# Patient Record
Sex: FEMALE | Race: WHITE | NOT HISPANIC OR LATINO | Employment: OTHER | URBAN - METROPOLITAN AREA
[De-identification: names, ages, dates, MRNs, and addresses within clinical notes are randomized per-mention and may not be internally consistent; named-entity substitution may affect disease eponyms.]

---

## 2024-03-27 ENCOUNTER — MEDICAL CORRESPONDENCE (OUTPATIENT)
Dept: HEALTH INFORMATION MANAGEMENT | Facility: CLINIC | Age: 74
End: 2024-03-27

## 2024-03-28 ENCOUNTER — TRANSCRIBE ORDERS (OUTPATIENT)
Dept: OTHER | Age: 74
End: 2024-03-28

## 2024-03-28 DIAGNOSIS — D03.9 MELANOMA IN SITU, UNSPECIFIED SITE (H): Primary | ICD-10-CM

## 2024-03-29 ENCOUNTER — TELEPHONE (OUTPATIENT)
Dept: DERMATOLOGY | Facility: CLINIC | Age: 74
End: 2024-03-29
Payer: COMMERCIAL

## 2024-03-29 NOTE — TELEPHONE ENCOUNTER
Left patient a voicemail to schedule a consult & mohs for   [facility] Mohs.  MIS, Left ankle-anterior, lentigo maligna pattern, narrowly free of sampled biopsy margins.  [id number] shave biopsy        with Dr. Caldwell. Provided my direct phone number.    Bisi Frank on 3/29/2024 at 9:46 AM

## 2024-04-04 NOTE — TELEPHONE ENCOUNTER
Left patient a voicemail to schedule a consult & mohs for   [facility] Mohs.  MIS, Left ankle-anterior, lentigo maligna pattern, narrowly free of sampled biopsy margins.  [id number] shave biopsy         with Dr. Caldwell. Provided my direct phone number.     Bisi Frank, Procedure  4/4/2024 1:42 PM

## 2024-04-04 NOTE — TELEPHONE ENCOUNTER
Called patient to schedule surgery with Dr. Caldwell    Date of Surgery: 05/08     Surgery type: Mohs    Consult scheduled: Yes    Has patient had mohs with us before? No    Additional comments: lissa Frank on 4/4/2024 at 4:04 PM

## 2024-04-12 NOTE — TELEPHONE ENCOUNTER
FUTURE VISIT INFORMATION      FUTURE VISIT INFORMATION:  Date: 4.16.24  Time: 3:00  Location: [facility]  REFERRAL INFORMATION:  Referring provider:  Edi  Referring providers clinic:  [facility]  Reason for visit/diagnosis  UMN Mohs. MIS, Left ankle-anterior, lentigo maligna pattern, narrowly free of sampled biopsy margins. [id number] shave biopsy      RECORDS REQUESTED FROM:       Clinic name Comments Records Status Imaging Status   [facility] 3.21.24  Path # [id number] CE Received

## 2024-04-16 ENCOUNTER — PRE VISIT (OUTPATIENT)
Dept: DERMATOLOGY | Facility: CLINIC | Age: 74
End: 2024-04-16

## 2024-04-16 ENCOUNTER — OFFICE VISIT (OUTPATIENT)
Dept: DERMATOLOGY | Facility: CLINIC | Age: 74
End: 2024-04-16
Payer: COMMERCIAL

## 2024-04-16 DIAGNOSIS — D03.72 MELANOMA IN SITU OF LEFT LOWER EXTREMITY (H): Primary | ICD-10-CM

## 2024-04-16 PROCEDURE — 99203 OFFICE O/P NEW LOW 30 MIN: CPT | Performed by: DERMATOLOGY

## 2024-04-16 ASSESSMENT — PAIN SCALES - GENERAL: PAINLEVEL: NO PAIN (0)

## 2024-04-16 NOTE — PROGRESS NOTES
Dermatologic Surgery Consultation Note    Dermatology Problem List:  Melanoma  - MIS, LM type, left ankle s/p shave 3/21/24, MMS scheduled 5/8 @ 8a [facility]  - MIS, LM type, left upper anterior arm, s/p shave 3/21/24  - MM, left upper back, BD: 0.4 mm, Navin's: IV, pT1a, s/p shave 3/21/24, s/p exc 4/5/24  Sebaceous Hyperplasia, head / anterior face  AK's s/p cryo    CC: mohs consult (Left ankle anterior)      Subjective: Trinity Theodore is a 73 year old female who presents today for Mohs micrographic surgery consultation for a recent diagnosis of skin cancer.  - Skin cancer(s): Melanoma in situ  - Location(s): L Ankle   - Referred by Dr. Prince Daley affiliated with [facility].   - no other concerns today    Objective: An exam of the left ankle was performed today   - 1 cm biopsy ulcer    Path report:   Case: [id number]     Collected:   03/21/2024     FINAL DIAGNOSIS    A.  Skin, Left Ankle - Anterior, shave:  - Melanoma in situ, lentigo maligna pattern, narrowly free of sampled biopsy margins.       B.  Skin, Left Upper Arm - Anterior, shave:  - Melanoma in situ, lentigo maligna pattern, narrowly free of sampled biopsy margins.       COMMENT (A,B):  These two junctional melanocyte proliferations are similar, each composed of crowded individual and nested atypical cells along and above the dermoepidermal junction, in significantly sun-damaged skin, without dermal invasion. Each site should be excised to ensure complete removal with a margin of uninvolved skin. The AJCC stage for each is pTis.     C.  Skin, Left Upper Back, shave:  - Malignant melanoma with the following features:  Breslow depth: 0.4 mm  Navin level: IV  Ulceration: Not identified   Angiolymphatic/perineural invasion: Not identified   Mitotic rate: 3 per square mm  Features of regression: Not identified   Associated nevus: Equivocal  Margins: Narrowly free of melanoma  AJCC stage: pT1a (8th ed AJCC Guidelines).      COMMENT (C): The  histologic  type  of this lesion is superficial spreading for tumor registry purposes. No macroscopic satellite nodule or microscopic satellitosis is evident in this specimen.      Assessment and Plan:     1. Plan for Mohs micrographic surgery for skin cancer(s) above:  *Review lab result(s): Dermpath report   - We discussed the nature of the diagnosis/condition above. We discussed the treatment options, including the risks benefits and expectations of these options. We recommend micrographic surgery as the most effective and most tissue sparing option for treatment, and the patient agrees to proceed with this.  The patient is aware of the risks, benefits and expectations of this procedure. The patient will be scheduled for this procedure, if not already done so.  - We anticipate the following closure type: Linear closure, such as complex linear closure (CLC)    Patient was discussed with and evaluated by attending physician Dr. Caldwell.    Attending Attestation  I attest that the Scribe recorded the interview and exam that I personally performed.  I have reviewed the note and edited it as necessary.    Eloy Caldwell M.D.  Professor  Director of Dermatologic Surgery  Department of Dermatology  [facility]

## 2024-04-16 NOTE — NURSING NOTE
Chief Complaint   Patient presents with    mohs consult     Left ankle anterior     Noelle CANTU CMA

## 2024-04-16 NOTE — LETTER
4/16/2024       RE: Trinity Theodore  [address]  [facility] [facility] [address]     Dear Colleague,    Thank you for referring your patient, Trinity Theodore, to the [facility] DERMATOLOGIC SURGERY CLINIC [place] at [facility]. Please see a copy of my visit note below.    Dermatologic Surgery Consultation Note    Dermatology Problem List:  Melanoma  - MIS, LM type, left ankle s/p shave 3/21/24, MMS scheduled 5/8 @ 8a [facility]  - MIS, LM type, left upper anterior arm, s/p shave 3/21/24  - MM, left upper back, BD: 0.4 mm, Navin's: IV, pT1a, s/p shave 3/21/24, s/p exc 4/5/24  Sebaceous Hyperplasia, head / anterior face  AK's s/p cryo    CC: mohs consult (Left ankle anterior)      Subjective: Trinity Theodore is a 73 year old female who presents today for Mohs micrographic surgery consultation for a recent diagnosis of skin cancer.  - Skin cancer(s): Melanoma in situ  - Location(s): L Ankle   - Referred by Dr. Prince Daley affiliated with [facility].   - no other concerns today    Objective: An exam of the left ankle was performed today   - 1 cm biopsy ulcer    Path report:   Case: [id number]     Collected:   03/21/2024     FINAL DIAGNOSIS    A.  Skin, Left Ankle - Anterior, shave:  - Melanoma in situ, lentigo maligna pattern, narrowly free of sampled biopsy margins.       B.  Skin, Left Upper Arm - Anterior, shave:  - Melanoma in situ, lentigo maligna pattern, narrowly free of sampled biopsy margins.       COMMENT (A,B):  These two junctional melanocyte proliferations are similar, each composed of crowded individual and nested atypical cells along and above the dermoepidermal junction, in significantly sun-damaged skin, without dermal invasion. Each site should be excised to ensure complete removal with a margin of uninvolved skin. The AJCC stage for each is pTis.     C.  Skin, Left Upper Back, shave:  - Malignant melanoma with the following  features:  Breslow depth: 0.4 mm  Navin level: IV  Ulceration: Not identified   Angiolymphatic/perineural invasion: Not identified   Mitotic rate: 3 per square mm  Features of regression: Not identified   Associated nevus: Equivocal  Margins: Narrowly free of melanoma  AJCC stage: pT1a (8th ed AJCC Guidelines).      COMMENT (C): The histologic  type  of this lesion is superficial spreading for tumor registry purposes. No macroscopic satellite nodule or microscopic satellitosis is evident in this specimen.      Assessment and Plan:     1. Plan for Mohs micrographic surgery for skin cancer(s) above:  *Review lab result(s): Dermpath report   - We discussed the nature of the diagnosis/condition above. We discussed the treatment options, including the risks benefits and expectations of these options. We recommend micrographic surgery as the most effective and most tissue sparing option for treatment, and the patient agrees to proceed with this.  The patient is aware of the risks, benefits and expectations of this procedure. The patient will be scheduled for this procedure, if not already done so.  - We anticipate the following closure type: Linear closure, such as complex linear closure (CLC)    Patient was discussed with and evaluated by attending physician Dr. Caldwell.    Attending Attestation  I attest that the Scribe recorded the interview and exam that I personally performed.  I have reviewed the note and edited it as necessary.    Eloy Caldwell M.D.  Professor  Director of Dermatologic Surgery  Department of Dermatology  [facility]

## 2024-05-02 ENCOUNTER — MYC MEDICAL ADVICE (OUTPATIENT)
Dept: DERMATOLOGY | Facility: CLINIC | Age: 74
End: 2024-05-02
Payer: COMMERCIAL

## 2024-05-08 ENCOUNTER — OFFICE VISIT (OUTPATIENT)
Dept: DERMATOLOGY | Facility: CLINIC | Age: 74
End: 2024-05-08
Payer: COMMERCIAL

## 2024-05-08 VITALS — DIASTOLIC BLOOD PRESSURE: 89 MMHG | SYSTOLIC BLOOD PRESSURE: 172 MMHG | HEART RATE: 78 BPM

## 2024-05-08 DIAGNOSIS — D03.72 MELANOMA IN SITU OF LEFT LOWER EXTREMITY INCLUDING HIP (H): ICD-10-CM

## 2024-05-08 DIAGNOSIS — D03.9 MELANOMA IN SITU, UNSPECIFIED SITE (H): ICD-10-CM

## 2024-05-08 PROCEDURE — 88314 HISTOCHEMICAL STAINS ADD-ON: CPT | Mod: 59 | Performed by: DERMATOLOGY

## 2024-05-08 PROCEDURE — 88342 IMHCHEM/IMCYTCHM 1ST ANTB: CPT | Mod: TC | Performed by: DERMATOLOGY

## 2024-05-08 PROCEDURE — 88305 TISSUE EXAM BY PATHOLOGIST: CPT | Mod: 26 | Performed by: PATHOLOGY

## 2024-05-08 PROCEDURE — 15275 SKIN SUB GRAFT FACE/NK/HF/G: CPT | Mod: GC | Performed by: DERMATOLOGY

## 2024-05-08 PROCEDURE — 88342 IMHCHEM/IMCYTCHM 1ST ANTB: CPT | Mod: 26 | Performed by: PATHOLOGY

## 2024-05-08 PROCEDURE — 17313 MOHS 1 STAGE T/A/L: CPT | Mod: GC | Performed by: DERMATOLOGY

## 2024-05-08 NOTE — PATIENT INSTRUCTIONS
Post-Operative Care for Granulating Site  After your surgery, a pressure bandage will be placed over the area. This will help prevent bleeding. Please follow these instructions as they will help you to prevent complications as your wound heals.  For the First 48 hours After Surgery:  Leave the pressure bandage on and keep it dry. If it should come loose, you may retape it, but do not take it off.  Relax and take it easy. Do not do any vigorous exercise, heavy lifting, or bending forward. This could cause the wound to bleed.  Post-operative pain is usually mild. You may alternate between 1000 mg of Tylenol (acetaminophen) and 400 mg of Ibuprofen every 4 hours.  Do not take more than 4,000mg of acetaminophen in a 24 hour period or 3200 mg of Ibuprofen in a 24 hr period.  Avoid alcohol and vitamin E as these may increase your tendency to bleed.  You may put an ice pack around the bandaged area for 20 minutes every 2-3 hours. This may help reduce swelling, bruising, and pain. Make sure the ice pack is waterproof so that the pressure bandage does not get wet.   You may see a small amount of drainage or blood on your pressure bandage. This is normal. However, if drainage or bleeding continues or saturates the bandage, you will need to apply firm pressure over the bandage with a washcloth for 15 minutes. If bleeding continues after applying pressure for 15 minutes, apply an ice pack to the bandaged area for 15 minutes. If bleeding still continues, go to the nearest emergency room.  48 Hours After Surgery:  Carefully remove the bandage and start daily wound care and dressing changes. You may also now shower and get the wound wet. Use caution when washing the wound, be gentle.  Do not use a wash cloth on the wound.  Daily Wound Care:  Wash with mild soap and water every day until wound appears well-healed.  Rinse well and pat dry.  Apply Vaseline over site with a Q-tip and re-apply a dressing until wound appears well healed.  " A well healed wound is \"pinked over\" with a shiny surface.  When area is \"pinked over\" it is no longer necessary to apply Vaseline.  Call Us If:  You have pain that is not controlled with Tylenol.  You have signs or symptoms of an infection, such as: fever over 100 degrees F, redness, warmth, or foul-smelling or yellow/creamy drainage from the wound.  Who should I call with questions?  [facility]: [phone]  [facility]: [phone]  For urgent needs outside of business hours call the [facility] at [phone] and ask for the dermatology resident on call   "

## 2024-05-08 NOTE — LETTER
5/8/2024       RE: Trinity Theodore  [address] [place] [address]     Dear Colleague,    Thank you for referring your patient, Trinity Theodore, to the [facility] DERMATOLOGIC SURGERY CLINIC [place] at [facility]. Please see a copy of my visit note below.    [facility] Dermatologic Surgery Clinic [place] Procedure Note    Dermatology Problem List:     Superficial SSI, current tx: Keflex  Melanoma  - MIS/LM, left anterior ankle, s/p shave 3/21/24, s/p MMS 5/8/24  - MIS/LM, left upper anterior arm, s/p shave 3/21/24, s/p outside excision 4/19/24  - MM, BD 0.4 mm, L upper back, s/p outside excision 4/5/24    Date of Service:  May 8, 2024  Surgery: Mohs micrographic surgery    Case 1  Repair Type: Secondary Intent with Puracol Collagen Graft  Repair Size: N/A  Suture Material: None  Tumor Type: Melanoma in situ  Location: L Ankle  Derm-Path Accession #: [id number]   PreOp Size: 1.5 x 1.5 cm  PostOp Size: 2.1 x 2.0 cm  Mohs Accession #:   Level of Defect: Fat    Procedure:    Stage I  We discussed the principles of treatment and most likely complications including scarring, bleeding, infection, swelling, pain, crusting, nerve damage, large wound,  incomplete excision, wound dehiscence,  nerve damage, recurrence, and a second procedure may be recommended to obtain the best cosmetic or functional result.    Informed consent was obtained and the patient underwent the procedure as follows:  The patient was placed supine on the operating table.  The cancer was identified, outlined with a marker, and verified by the patient.  The entire surgical field was prepped with chlorhexidine.  The surgical site was anesthetized using lidocaine with epinephrine.    The area of clinically apparent tumor was excised and sent for permanent sections to rule out invasive melanoma. The peripheral rim of tissue was then surgically excised using a #15  blade and was then transferred onto a specimen sheet maintaining the orientation of the specimen. Hemostasis was obtained using bipolar electrocoagulation. The wound site was then covered with a dressing while the tissue samples were processed for examination.    The excised tissue was transported to the Mohs histology laboratory maintaining the tissue orientation.  The tissue specimen was relaxed so that the entire surgical margin was in a a single horizontal plane for sectioning and inked for precise mapping.  A precise reference map was drawn to reflect the sectioning of the specimen, colored inking of the margins, and orientation on the patient. The tissue was processed using horizontal sectioning of the base and continuous peripheral margins. Vertical, bread loafed sections were obtained from the central portion of the lesion, prior to being sent for permament sections. A control biopsy at the left shin was taken to compare the density and periodicity of melanocytes along the dermal-epidermal junction.     The tissue sections were stained with Hematoxylin and Eosin (H&E), as well as Melanoma antigen recognized by T cells or Melan-A (MART-1) stain. The histopathologic sections were reviewed in conjunction with the reference map.     Total blocks: 2   Total slides:  8      Was the skin lesion clear at this stage?: Yes, the skin lesion was determined clear at periphery at this stage: There was a relatively normal periodicity and density of melanocytes along the dermal-epidermal junction noted at the peripheral margins, therefore Mohs surgery was complete.    REPAIR: Secondary intent with Puracol collagen graft    The patient is status post Mohs micrographic surgery.  The surgical site was examined with attention to normal anatomic and functional relationships.  After consideration and discussion of multiple options with the patient, it was determined that healing by second intention would offer the best chance for  preservation/restoration of all normal anatomic and functional relationships.     The open wound was cleaned with chlorhexidine and rinsed with sterile saline, a Puracol collagen graft was utilized to promote healing and protect the wound bed. The graft was secured to the wound base using skin glue. A pressure dressing consisting of non-adherent gauze and tape was applied.   Wound care was discussed with patient both orally and in writing.     Puracol Lot Number: [id number]   Expiration: 8/31/26    Dr. Yashira Kim (Mohs micrographic surgery fellow) performed the Mohs micrographic surgery under the direct supervision of Eloy Caldwell MD, who was present for the entire micrographic surgery, and always immediately available.    Suture removal: N/A (all dissolving sutures used)    Follow-up with dermatologic surgery: 4 week wound check, sooner PRN    Yashira Kim MD  Micrographic Surgery and Dermatologic Oncology (MSDO) Fellow, PGY-5      Staff Involved:   Scribe/Fellow/Staff    Scribe Disclosure:   I, JF YAÑEZ, am serving as a scribe; to document services personally performed by Eloy Caldwell MD -based on data collection and the provider's statements to me.       Attending attestation:  I was present for key elements of the procedure and immediately available for all other portions of the procedure.  I have reviewed the note and edited it as necessary.    Eloy Caldwell M.D.  Professor  Director of Dermatologic Surgery  Department of Dermatology  [facility]    Dermatology Surgery Clinic  [facility] [place] [id number]

## 2024-05-08 NOTE — PROGRESS NOTES
[facility] Dermatologic Surgery Clinic [place] Procedure Note    Dermatology Problem List:     Superficial SSI, current tx: Keflex  Melanoma  - MIS/LM, left anterior ankle, s/p shave 3/21/24, s/p MMS 5/8/24  - MIS/LM, left upper anterior arm, s/p shave 3/21/24, s/p outside excision 4/19/24  - MM, BD 0.4 mm, L upper back, s/p outside excision 4/5/24    Date of Service:  May 8, 2024  Surgery: Mohs micrographic surgery    Case 1  Repair Type: Secondary Intent with Puracol Collagen Graft  Repair Size: N/A  Suture Material: None  Tumor Type: Melanoma in situ  Location: L Ankle  Derm-Path Accession #: [id number]   PreOp Size: 1.5 x 1.5 cm  PostOp Size: 2.1 x 2.0 cm  Mohs Accession #:   Level of Defect: Fat    Procedure:    Stage I  We discussed the principles of treatment and most likely complications including scarring, bleeding, infection, swelling, pain, crusting, nerve damage, large wound,  incomplete excision, wound dehiscence,  nerve damage, recurrence, and a second procedure may be recommended to obtain the best cosmetic or functional result.    Informed consent was obtained and the patient underwent the procedure as follows:  The patient was placed supine on the operating table.  The cancer was identified, outlined with a marker, and verified by the patient.  The entire surgical field was prepped with chlorhexidine.  The surgical site was anesthetized using lidocaine with epinephrine.    The area of clinically apparent tumor was excised and sent for permanent sections to rule out invasive melanoma. The peripheral rim of tissue was then surgically excised using a #15 blade and was then transferred onto a specimen sheet maintaining the orientation of the specimen. Hemostasis was obtained using bipolar electrocoagulation. The wound site was then covered with a dressing while the tissue samples were processed for examination.    The excised tissue was transported to the Mohs histology laboratory  maintaining the tissue orientation.  The tissue specimen was relaxed so that the entire surgical margin was in a a single horizontal plane for sectioning and inked for precise mapping.  A precise reference map was drawn to reflect the sectioning of the specimen, colored inking of the margins, and orientation on the patient. The tissue was processed using horizontal sectioning of the base and continuous peripheral margins. Vertical, bread loafed sections were obtained from the central portion of the lesion, prior to being sent for permament sections. A control biopsy at the left shin was taken to compare the density and periodicity of melanocytes along the dermal-epidermal junction.     The tissue sections were stained with Hematoxylin and Eosin (H&E), as well as Melanoma antigen recognized by T cells or Melan-A (MART-1) stain. The histopathologic sections were reviewed in conjunction with the reference map.     Total blocks: 2   Total slides:  8      Was the skin lesion clear at this stage?: Yes, the skin lesion was determined clear at periphery at this stage: There was a relatively normal periodicity and density of melanocytes along the dermal-epidermal junction noted at the peripheral margins, therefore Mohs surgery was complete.    REPAIR: Secondary intent with Puracol collagen graft    The patient is status post Mohs micrographic surgery.  The surgical site was examined with attention to normal anatomic and functional relationships.  After consideration and discussion of multiple options with the patient, it was determined that healing by second intention would offer the best chance for preservation/restoration of all normal anatomic and functional relationships.     The open wound was cleaned with chlorhexidine and rinsed with sterile saline, a Puracol collagen graft was utilized to promote healing and protect the wound bed. The graft was secured to the wound base using skin glue. A pressure dressing consisting  of non-adherent gauze and tape was applied.   Wound care was discussed with patient both orally and in writing.     Puracol Lot Number: [id number]   Expiration: 8/31/26    Dr. Yashira Kim (Mohs micrographic surgery fellow) performed the Mohs micrographic surgery under the direct supervision of Eloy Caldwell MD, who was present for the entire micrographic surgery, and always immediately available.    Suture removal: N/A (all dissolving sutures used)    Follow-up with dermatologic surgery: 4 week wound check, sooner PRN    Yashira Kim MD  Micrographic Surgery and Dermatologic Oncology (MSDO) Fellow, PGY-5      Staff Involved:   Scribe/Fellow/Staff    Scribe Disclosure:   I, JF YAÑEZ, am serving as a scribe; to document services personally performed by Eloy Caldwell MD -based on data collection and the provider's statements to me.       Attending attestation:  I was present for key elements of the procedure and immediately available for all other portions of the procedure.  I have reviewed the note and edited it as necessary.    Eloy Caldwell M.D.  Professor  Director of Dermatologic Surgery  Department of Dermatology  [facility]    Dermatology Surgery Clinic  [facility]  [address] [place] [id number]

## 2024-05-08 NOTE — NURSING NOTE
Chief Complaint   Patient presents with    Derm Problem     L anterior angle lentigo maligna     Dione GARRETT RN-BSN  Dermatology Surgery  [phone]

## 2024-05-09 ENCOUNTER — TELEPHONE (OUTPATIENT)
Dept: DERMATOLOGY | Facility: CLINIC | Age: 74
End: 2024-05-09
Payer: COMMERCIAL

## 2024-05-09 NOTE — TELEPHONE ENCOUNTER
Follow up call completed following Mohs procedure with Dr. Caldwell.       Are you having pain? no  Are you taking pain medication? Tylenol   Are you applying ice?  yes  Have you had any noticeable bleeding through the bandage? no   Do you have any other concerns? no       Please call [phone] if you have any questions or concerns.

## 2024-05-13 LAB
PATH REPORT.COMMENTS IMP SPEC: NORMAL
PATH REPORT.COMMENTS IMP SPEC: NORMAL
PATH REPORT.FINAL DX SPEC: NORMAL
PATH REPORT.GROSS SPEC: NORMAL
PATH REPORT.MICROSCOPIC SPEC OTHER STN: NORMAL
PATH REPORT.RELEVANT HX SPEC: NORMAL

## 2024-06-04 ENCOUNTER — OFFICE VISIT (OUTPATIENT)
Dept: DERMATOLOGY | Facility: CLINIC | Age: 74
End: 2024-06-04
Payer: COMMERCIAL

## 2024-06-04 DIAGNOSIS — Z48.89 ENCOUNTER FOR POSTOPERATIVE WOUND CHECK: Primary | ICD-10-CM

## 2024-06-04 DIAGNOSIS — Z86.006 HISTORY OF MELANOMA IN SITU: ICD-10-CM

## 2024-06-04 PROCEDURE — 99212 OFFICE O/P EST SF 10 MIN: CPT | Performed by: DERMATOLOGY

## 2024-06-04 ASSESSMENT — PAIN SCALES - GENERAL: PAINLEVEL: NO PAIN (0)

## 2024-06-04 NOTE — LETTER
6/4/2024       RE: Trinity Theodore  [address] [facility] [address]     Dear Colleague,    Thank you for referring your patient, Trinity Theodore, to the [facility] [place] [facility]. Please see a copy of my visit note below.    Dermatologic Surgery Post-Op Wound Check     CC: No chief complaint on file.      Dermatology Problem List:  Superficial SSI, current tx: Keflex  Melanoma  - MIS/LM, left anterior ankle, s/p shave 3/21/24, s/p MMS 5/8/24  - MIS/LM, left upper anterior arm, s/p shave 3/21/24, s/p outside excision 4/19/24  - MM, BD 0.4 mm, L upper back, s/p outside excision 4/5/24    Subjective: Trinity Theodore is a 73 year old female who presents today for wound check after MMS with healing by secondary intention was performed for MIS/LM of the left anterior ankle on 5/8/24.   - no problems  - no other concerns today    Objective: An exam of the left anterior ankle was performed today   - The surgical site noted above is clean, dry, and intact. There is no surrounding erythema, purulence, or significant tenderness to palpation. No clinical evidence of infection noted today.    Assessment and Plan:     1. MIS/LM, left anterior ankle, s/p shave 3/21/24, s/p MMS 5/8/24  - The patient's surgery site(s) is/are healing very well. No evidence of infection on examination today.  - The patient was told to continue with wound cares until the area(s) is/are no longer crusted.   - The patient should follow up with dermatologic surgery PRN, as well as continue with regular skin exams in general dermatology clinic.    Patient was discussed with and evaluated by attending physician Dr. Eloy Caldwell.    Staff Involved:   Scribe/Fellow/Staff    Scribe Disclosure:   JF GREER, am serving as a scribe; to document services personally performed by Eloy Caldwell MD -based on data collection and the provider's statements  to me.     Attending Attestation  I attest that the Scribe recorded the interview and exam that I personally performed.  I have reviewed the note and edited it as necessary.    Eloy Caldwell M.D.  Professor  Director of Dermatologic Surgery  Department of Dermatology  [facility]

## 2024-06-04 NOTE — NURSING NOTE
Chief Complaint   Patient presents with    Derm Problem     Patient presents for wound check of left ankle surgery site.      Sharonda Maloney LPN

## 2025-02-22 ENCOUNTER — HEALTH MAINTENANCE LETTER (OUTPATIENT)
Age: 75
End: 2025-02-22